# Patient Record
Sex: FEMALE | Race: BLACK OR AFRICAN AMERICAN | Employment: STUDENT | URBAN - METROPOLITAN AREA
[De-identification: names, ages, dates, MRNs, and addresses within clinical notes are randomized per-mention and may not be internally consistent; named-entity substitution may affect disease eponyms.]

---

## 2020-04-24 LAB
ANTIBODY SCREEN, EXTERNAL: NEGATIVE
CHLAMYDIA, EXTERNAL: NEGATIVE
HBSAG, EXTERNAL: NEGATIVE
HIV, EXTERNAL: NEGATIVE
N. GONORRHEA, EXTERNAL: NEGATIVE
RPR, EXTERNAL: NON REACTIVE
RUBELLA, EXTERNAL: NORMAL
TYPE, ABO & RH, EXTERNAL: NORMAL

## 2020-10-21 LAB — GRBS, EXTERNAL: POSITIVE

## 2020-11-05 ENCOUNTER — HOSPITAL ENCOUNTER (EMERGENCY)
Age: 30
Discharge: HOME | End: 2020-11-05
Attending: OBSTETRICS & GYNECOLOGY | Admitting: OBSTETRICS & GYNECOLOGY
Payer: COMMERCIAL

## 2020-11-05 VITALS
BODY MASS INDEX: 31.82 KG/M2 | HEIGHT: 66 IN | TEMPERATURE: 98.3 F | WEIGHT: 198 LBS | RESPIRATION RATE: 17 BRPM | DIASTOLIC BLOOD PRESSURE: 77 MMHG | SYSTOLIC BLOOD PRESSURE: 129 MMHG | HEART RATE: 91 BPM

## 2020-11-05 PROBLEM — Z3A.38 38 WEEKS GESTATION OF PREGNANCY: Status: ACTIVE | Noted: 2020-11-05

## 2020-11-05 PROBLEM — O26.893 ABDOMINAL PAIN IN PREGNANCY, THIRD TRIMESTER: Status: ACTIVE | Noted: 2020-11-05

## 2020-11-05 PROBLEM — R10.9 ABDOMINAL PAIN IN PREGNANCY, THIRD TRIMESTER: Status: ACTIVE | Noted: 2020-11-05

## 2020-11-05 PROBLEM — R10.30 ABDOMINAL PAIN, LOWER: Status: ACTIVE | Noted: 2020-11-05

## 2020-11-05 LAB
APPEARANCE UR: CLEAR
BACTERIA URNS QL MICRO: NEGATIVE /HPF
BILIRUB UR QL: NEGATIVE
COLOR UR: ABNORMAL
EPITH CASTS URNS QL MICRO: ABNORMAL /LPF
GLUCOSE UR STRIP.AUTO-MCNC: NEGATIVE MG/DL
HGB UR QL STRIP: NEGATIVE
HYALINE CASTS URNS QL MICRO: ABNORMAL /LPF (ref 0–5)
KETONES UR QL STRIP.AUTO: NEGATIVE MG/DL
LEUKOCYTE ESTERASE UR QL STRIP.AUTO: ABNORMAL
NITRITE UR QL STRIP.AUTO: NEGATIVE
PH UR STRIP: 6.5 [PH] (ref 5–8)
PROT UR STRIP-MCNC: NEGATIVE MG/DL
RBC #/AREA URNS HPF: ABNORMAL /HPF (ref 0–5)
SP GR UR REFRACTOMETRY: 1.01 (ref 1–1.03)
UA: UC IF INDICATED,UAUC: ABNORMAL
UROBILINOGEN UR QL STRIP.AUTO: 0.2 EU/DL (ref 0.2–1)
WBC URNS QL MICRO: ABNORMAL /HPF (ref 0–4)

## 2020-11-05 PROCEDURE — 81001 URINALYSIS AUTO W/SCOPE: CPT

## 2020-11-05 PROCEDURE — 59025 FETAL NON-STRESS TEST: CPT

## 2020-11-05 PROCEDURE — 2709999900 HC NON-CHARGEABLE SUPPLY

## 2020-11-05 PROCEDURE — 74011250637 HC RX REV CODE- 250/637: Performed by: OBSTETRICS & GYNECOLOGY

## 2020-11-05 PROCEDURE — 99283 EMERGENCY DEPT VISIT LOW MDM: CPT

## 2020-11-05 RX ORDER — VITAMIN A ACETATE, BETA CAROTENE, ASCORBIC ACID, CHOLECALCIFEROL, .ALPHA.-TOCOPHEROL ACETATE, DL-, THIAMINE MONONITRATE, RIBOFLAVIN, NIACINAMIDE, PYRIDOXINE HYDROCHLORIDE, FOLIC ACID, CYANOCOBALAMIN, CALCIUM CARBONATE, FERROUS FUMARATE, ZINC OXIDE, CUPRIC OXIDE 3080; 12; 120; 400; 1; 1.84; 3; 20; 22; 920; 25; 200; 27; 10; 2 [IU]/1; UG/1; MG/1; [IU]/1; MG/1; MG/1; MG/1; MG/1; MG/1; [IU]/1; MG/1; MG/1; MG/1; MG/1; MG/1
1 TABLET, FILM COATED ORAL DAILY
COMMUNITY

## 2020-11-05 RX ORDER — ACETAMINOPHEN 500 MG
1000 TABLET ORAL
Status: DISCONTINUED | OUTPATIENT
Start: 2020-11-05 | End: 2020-11-06 | Stop reason: HOSPADM

## 2020-11-05 RX ORDER — MORPHINE SULFATE 4 MG/ML
10 INJECTION INTRAVENOUS ONCE
Status: DISCONTINUED | OUTPATIENT
Start: 2020-11-05 | End: 2020-11-06 | Stop reason: HOSPADM

## 2020-11-05 RX ADMIN — ACETAMINOPHEN 1000 MG: 500 TABLET ORAL at 19:05

## 2020-11-05 NOTE — PROGRESS NOTES
[address] [address] 11/17/20 [facility] admitted under triage for c/o lower abd cramping. Pt denies any problems with the pregnancy or outside of the pregnancy. [address]  Dr Henriquez Masters at the bedside to talk with the pt about her history. 1849  SVE 0/20/-3  Plan is to wait for [facility] to come back and then re check pt later.

## 2020-11-06 PROBLEM — O47.1 FALSE LABOR AFTER 37 WEEKS OF GESTATION WITHOUT DELIVERY: Status: ACTIVE | Noted: 2020-11-06

## 2020-11-06 NOTE — H&P
Obstetrics Admission History & Physical    Name: Kailee Rodrigues MRN: [id number]  SSN: [id number]    YOB: 1990  Age: 26 y.o. Sex: female      Subjective:     Reason for Admission:  Pregnancy and lower abdominal pain    History of Present Illness: Kailee Rodrigues is a 26 y.o.  female with an estimated gestational age of 36w4d with Estimated Date of Delivery: 20. Patient complains of mild abdominal pain for 4 days. Pregnancy has been uncomplicated. Patient denies headache , nausea and vomiting, vaginal bleeding  and vaginal leaking of fluid . OB History        1    Para        Term                AB        Living           SAB        TAB        Ectopic        Molar        Multiple        Live Births                  No past medical history on file. No past surgical history on file.   Social History     Socioeconomic History    Marital status: Not on file     Spouse name: Not on file    Number of children: Not on file    Years of education: Not on file    Highest education level: Not on file   Occupational History    Not on file   Social Needs    Financial resource strain: Not on file    Food insecurity     Worry: Not on file     Inability: Not on file    Transportation needs     Medical: Not on file     Non-medical: Not on file   Tobacco Use    Smoking status: Never Smoker   Substance and Sexual Activity    Alcohol use: Never     Frequency: Never    Drug use: Never    Sexual activity: Yes     Partners: Male     Birth control/protection: Pill   Lifestyle    Physical activity     Days per week: Not on file     Minutes per session: Not on file    Stress: Not on file   Relationships    Social connections     Talks on phone: Not on file     Gets together: Not on file     Attends Scientologist service: Not on file     Active member of club or organization: Not on file     Attends meetings of clubs or organizations: Not on file     Relationship status: Not on file   Amber Hu Intimate partner violence     Fear of current or ex partner: Not on file     Emotionally abused: Not on file     Physically abused: Not on file     Forced sexual activity: Not on file   Other Topics Concern     Service Not Asked    Blood Transfusions Not Asked    Caffeine Concern Not Asked    Occupational Exposure Not Asked    Hobby Hazards Not Asked    Sleep Concern Not Asked    Stress Concern Not Asked    Weight Concern Not Asked    Special Diet Not Asked    Back Care Not Asked    Exercise Not Asked    Bike Helmet Not Asked    [address] Not Asked    Self-Exams Not Asked   Social History Narrative    Not on file     No family history on file. Allergies   Allergen Reactions    Flagyl [Metronidazole] Other (comments)     Heart-Johnsons syndrome     Prior to Admission medications    Medication Sig Start Date End Date Taking? Authorizing Provider   prenatal vit-calcium-iron-fa (Prenatal Plus, calcium carb,) 27 mg iron- 1 mg tab Take 1 Tab by mouth daily. Yes Provider, Historical        Review of Systems:  A comprehensive review of systems was negative except for that written in the History of Present Illness. Objective:     Vitals:  Blood pressure 129/77, pulse 91, temperature 98.3 °F (36.8 °C), resp. rate 17, height 5' 6\" (1.676 m), weight 89.8 kg (198 lb). Temp (24hrs), Av.8 °F (37.1 °C), Min:98.3 °F (36.8 °C), Max:99.2 °F (37.3 °C)    BP  Min: 129/77  Max: 132/82     Physical Exam:  Patient without distress.   Lung: normal respiratory effort  Abdomen: soft, nontender  Fundus: soft and non tender  Cervical Exam: 0/20 %/-3/   Lower Extremities: minimal edema       Membranes:  Intact    Uterine Activity:  None    Fetal Heart Rate:  Reactive       Labs:   Recent Results (from the past 24 hour(s))   URINALYSIS W/ REFLEX CULTURE    Collection Time: 20  6:30 PM    Specimen: Urine   Result Value Ref Range    Color YELLOW/STRAW      Appearance CLEAR CLEAR      Specific gravity 1.007 1.003 - 1.030      pH (UA) 6.5 5.0 - 8.0      Protein Negative NEG mg/dL    Glucose Negative NEG mg/dL    Ketone Negative NEG mg/dL    Bilirubin Negative NEG      Blood Negative NEG      Urobilinogen 0.2 0.2 - 1.0 EU/dL    Nitrites Negative NEG      Leukocyte Esterase SMALL (A) NEG      WBC PENDING /hpf    RBC PENDING /hpf    Epithelial cells PENDING /lpf    Bacteria PENDING /hpf    UA:UC IF INDICATED PENDING        Assessment and Plan:     Lower abdominal pain, r/o labor. Cervix unchanged since exam in office yesterday. UA micro pending, will treat for UTI if indicated. Recheck in 2 hrs or PRN.     Signed By:  Anita Croft MD     November 5, 2020

## 2020-11-06 NOTE — PROGRESS NOTES
Labor Progress Note      Assessment/Plan:    Patient Active Problem List   Diagnosis Code    Abdominal pain in pregnancy, third trimester O26.893, R10.9    Abdominal pain, lower R10.30    38 weeks gestation of pregnancy Z3A.38    False labor after 37 weeks of gestation without delivery O47.1     Patient desired discharge home, wihtout receiving the morphine  She was discharged home, given labor warnings, and to follow up as  scheduled  Elmira Jay MD  11/6/2020

## 2020-11-06 NOTE — PROGRESS NOTES
Labor Progress Note  Patient seen, fetal heart rate and contraction pattern evaluated, patient examined. Complains of irregular pains. No data found.     Physical Exam:  Cervical Exam  Dilation (cm): 0  Eff: 20 %  Station: -3  Vaginal exam done by? : Dr Dante Leon: Vertex  Membrane Status: Intact       Membranes:  Intact  Uterine Activity: Frequency: Every 5 minutes, Duration: 60 seconds and Intensity: mild  Fetal Heart Rate: Baseline: 125 per minute  Variability: moderate  Accelerations: yes  Decelerations: none  Category: 1      Assessment/Plan:    Patient Active Problem List   Diagnosis Code    Abdominal pain in pregnancy, third trimester O26.893, R10.9    Abdominal pain, lower R10.30    38 weeks gestation of pregnancy Z3A.38     Abdominal pain in pregnancy, third trimester  Painful contractions without cervical change  Will treat with morphine and observe    Zainab Cooper MD  11/5/2020

## 2020-11-06 NOTE — PROGRESS NOTES
1900 - Bedside and Verbal shift change report given to ABRIL Lynch (oncoming nurse) by CONCEPCION Weaver RN (offgoing nurse). Report included the following information SBAR, Kardex, Procedure Summary, MAR and Recent Results. Assumed care of pt at this time. 1905 - Pt c/o pain. RN at pt bedside administering tylenol at this time. 2058 - Dr. Sima Thornton at pt bedside discussing POC with pt. Pt agreeable to receiving IM morphine. RN to administer soon. 2115 - RN at pt bedside to administer IM morphine. Pt states, \"after thinking it through, I have decided to hold off on taking the morphine. I'll just go home. \" RN to notify MD.    2128 - RN notifying MD to pt's decision to go home. Tosin Palafox from MD to d/c pt home. 2140 - RN at pt bedside reviewing d/c instructions with pt, including belly pain in pregnancy, pregnancy precautions, fetal kick counts, and week 38 of pregnancy. Pt verbalizes understanding. 2146 - RN observed pt ambulating off unit with personal belongings and spouse.

## 2020-11-06 NOTE — DISCHARGE INSTRUCTIONS
Pregnancy Precautions: Care Instructions  Your Care Instructions     There is no sure way to prevent labor before your due date ( labor) or to prevent most other pregnancy problems. But there are things you can do to increase your chances of a healthy pregnancy. Go to your appointments, follow your doctor's advice, and take good care of yourself. Eat well, and exercise (if your doctor agrees). And make sure to drink plenty of water. Follow-up care is a key part of your treatment and safety. Be sure to make and go to all appointments, and call your doctor if you are having problems. It's also a good idea to know your test results and keep a list of the medicines you take. How can you care for yourself at home? · Make sure you go to your prenatal appointments. At each visit, your doctor will check your blood pressure. Your doctor will also check to see if you have protein in your urine. High blood pressure and protein in urine are signs of preeclampsia. This condition can be dangerous for you and your baby. · Drink plenty of fluids, enough so that your urine is light yellow or clear like water. Dehydration can cause contractions. If you have kidney, heart, or liver disease and have to limit fluids, talk with your doctor before you increase the amount of fluids you drink. · Tell your doctor right away if you notice any symptoms of an infection, such as:  ? Burning when you urinate. ? A foul-smelling discharge from your vagina. ? Vaginal itching. ? Unexplained fever. ? Unusual pain or soreness in your uterus or lower belly. · Eat a balanced diet. Include plenty of foods that are high in calcium and iron. ? Foods high in calcium include milk, cheese, yogurt, almonds, and broccoli. ? Foods high in iron include red meat, shellfish, poultry, eggs, beans, raisins, whole-grain bread, and leafy green vegetables. · Do not smoke.  If you need help quitting, talk to your doctor about stop-smoking programs and medicines. These can increase your chances of quitting for good. · Do not drink alcohol or use illegal drugs. · Follow your doctor's directions about activity. Your doctor will let you know how much, if any, exercise you can do. · Ask your doctor if you can have sex. If you are at risk for early labor, your doctor may ask you to not have sex. · Take care to prevent falls. During pregnancy, your joints are loose, and your balance is off. Sports such as bicycling, skiing, or in-line skating can increase your risk of falling. And don't ride horses or motorcycles, dive, water ski, scuba dive, or parachute jump while you are pregnant. · Avoid getting very hot. Do not use saunas or hot tubs. Avoid staying out in the sun in hot weather for long periods. Take acetaminophen (Tylenol) to lower a high fever. · Do not take any over-the-counter or herbal medicines or supplements without talking to your doctor or pharmacist first.  When should you call for help? Call 911 anytime you think you may need emergency care. For example, call if:    · You passed out (lost consciousness).     · You have a seizure.     · You have severe vaginal bleeding.     · You have severe pain in your belly or pelvis.     · You have had fluid gushing or leaking from your vagina and you know or think the umbilical cord is bulging into your vagina. If this happens, immediately get down on your knees so your rear end (buttocks) is higher than your head. This will decrease the pressure on the cord until help arrives. Call your doctor now or seek immediate medical care if:    · You have signs of preeclampsia, such as:  ? Sudden swelling of your face, hands, or feet. ? New vision problems (such as dimness, blurring, or seeing spots). ? A severe headache.     · You have any vaginal bleeding.     · You have belly pain or cramping.     · You have a fever.     · You have had regular contractions (with or without pain) for an hour.  This means that you have 8 or more within 1 hour or 4 or more in 20 minutes after you change your position and drink fluids.     · You have a sudden release of fluid from your vagina.     · You have low back pain or pelvic pressure that does not go away.     · You notice that your baby has stopped moving or is moving much less than normal.   Watch closely for changes in your health, and be sure to contact your doctor if you have any problems. Where can you learn more? Go to http://www.hart.com/  Enter Y951 in the search box to learn more about \"Pregnancy Precautions: Care Instructions. \"  Current as of: February 11, 2020               Content Version: 12.6  © 9263-2519 [employer]. Care instructions adapted under license by [employer] (which disclaims liability or warranty for this information). If you have questions about a medical condition or this instruction, always ask your healthcare professional. [place] [address] any warranty or liability for your use of this information. Counting Your Baby's Kicks: Care Instructions  Your Care Instructions     Counting your baby's kicks is one way your doctor can tell that your baby is healthy. Most women--especially in a first pregnancy--feel their baby move for the first time between 16 and 22 weeks. The movement may feel like flutters rather than kicks. Your baby may move more at certain times of the day. When you are active, you may notice less kicking than when you are resting. At your prenatal visits, your doctor will ask whether the baby is active. In your last trimester, your doctor may ask you to count the number of times you feel your baby move. Follow-up care is a key part of your treatment and safety. Be sure to make and go to all appointments, and call your doctor if you are having problems. It's also a good idea to know your test results and keep a list of the medicines you take.   How do you count fetal kicks? · A common method of checking your baby's movement is to count the number of kicks or moves you feel in 1 hour. Ten movements (such as kicks, flutters, or rolls) in 1 hour are normal. Some doctors suggest that you count in the morning until you get to 10 movements. Then you can quit for that day and start again the next day. · Pick your baby's most active time of day to count. This may be any time from morning to evening. · If you do not feel 10 movements in an hour, your baby may be sleeping. Wait for the next hour and count again. When should you call for help? Call your doctor now or seek immediate medical care if:    · You noticed that your baby has stopped moving or is moving much less than normal.   Watch closely for changes in your health, and be sure to contact your doctor if you have any problems. Where can you learn more? Go to http://www.gray.com/  Enter [id number] in the search box to learn more about \"Counting Your Baby's Kicks: Care Instructions. \"  Current as of: February 11, 2020               Content Version: 12.6  © 1095-7038 [employer]. Care instructions adapted under license by [employer] (which disclaims liability or warranty for this information). If you have questions about a medical condition or this instruction, always ask your healthcare professional. [address] any warranty or liability for your use of this information. Belly Pain in Pregnancy: Care Instructions  Your Care Instructions     When you're pregnant, any belly pain can be a worry. You may not want to call your doctor about every pain you have. But you don't want to miss something that is dangerous for you or your baby. Even if it feels familiar, belly pain can mean something new when you're pregnant. It's important to know when to call your doctor.  It will also help to know how to care for yourself at home when your pain is not caused by anything harmful. · When belly pain is more severe or constant, see a doctor right away. · If you're sure your belly pain is a sign of labor, call your doctor. · When belly pain is brief, it's usually a normal part of pregnancy. It might be related to changes in the growing uterus. Or it could be the stretching of ligaments called round ligaments. These ligaments help support the uterus. Round ligament pain can be on either side of your belly. It can also be felt in your hips or groin. Follow-up care is a key part of your treatment and safety. Be sure to make and go to all appointments, and call your doctor if you are having problems. It's also a good idea to know your test results and keep a list of the medicines you take. How can you tell if belly pain is a sign of labor? When belly pain is caused by labor, it can feel like mild or menstrual-like cramps in your lower belly. These cramps are probably contractions. They can happen in your second or third trimester. You may also have:  · A steady, dull ache in your lower back, pelvis, or thighs. · A feeling of pressure in your pelvis or lower belly. · Changes in your vaginal discharge or a sudden release of fluid from the vagina. If you think you are in labor, call your doctor. How can you care for yourself at home? When belly pain is mild and is not a symptom of labor:  · Rest until you feel better. · Take a warm bath. · Think about what you drink and eat:  ? Drink plenty of fluids. Choose water and other caffeine-free clear liquids until you feel better. ? Try eating small, frequent meals. If your stomach is upset, try bland, low-fat foods like plain rice, broiled chicken, toast, and yogurt. · Think about how you move if you are having brief pains from stretching of the round ligaments. ? Try gentle stretching. ? Move a little more slowly when turning in bed or getting up from a chair, so those ligaments don't stretch quickly. ?  [employer] forward a bit if you think you are going to cough or sneeze. When should you call for help? Call 911 anytime you think you may need emergency care. For example, call if:    · You have sudden, severe pain in your belly.     · You have severe vaginal bleeding.     · You passed out (lost consciousness).     · You have a seizure. Call your doctor now or seek immediate medical care if:    · You have new or worse belly pain or cramping.     · You have any vaginal bleeding.     · You have a fever.     · You have symptoms of preeclampsia, such as:  ? Sudden swelling of your face, hands, or feet. ? New vision problems (such as dimness, blurring, or seeing spots). ? A severe headache.     · You think that you may be in labor. This means that you've had at least 8 contractions within 1 hour or at least 4 contractions within 20 minutes, even after you change your position and drink fluids.     · You have symptoms of a urinary tract infection. These may include:  ? Pain or burning when you urinate. ? A frequent need to urinate without being able to pass much urine. ? Pain in the flank, which is just below the rib cage and above the waist on either side of the back. ? Blood in your urine. Watch closely for changes in your health, and be sure to contact your doctor if you are worried about your or your baby's health. Where can you learn more? Go to http://www.hart.com/  Enter B275 in the search box to learn more about \"Belly Pain in Pregnancy: Care Instructions. \"  Current as of: February 11, 2020               Content Version: 12.6  © 1284-0585 [employer]. Care instructions adapted under license by [employer] (which disclaims liability or warranty for this information).  If you have questions about a medical condition or this instruction, always ask your healthcare professional. [place] [address] any warranty or liability for your use of this information. Week 38 of Your Pregnancy: Care Instructions  Your Care Instructions     Believe it or not, your baby is almost here. You may have ideas about your baby's personality because of how much he or she moves. Or you may have noticed how he or she responds to sounds, warmth, cold, and light. You may even know what kind of music your baby likes. By now, you have a better idea of what to expect during delivery. You may have talked about your birth preferences with your doctor. But even if you want a vaginal birth, it is a good idea to learn about  births.  birth means that your baby is born through a cut (incision) in your lower belly. It is sometimes the best choice for the health of the baby and the mother. This care sheet can help you understand  births. It also gives you information about what to expect after your baby is born. And it helps you understand more about postpartum depression. Follow-up care is a key part of your treatment and safety. Be sure to make and go to all appointments, and call your doctor if you are having problems. It's also a good idea to know your test results and keep a list of the medicines you take. How can you care for yourself at home? Learn about  birth  · Most C-sections are unplanned. They are done because of problems that occur during labor. These problems might include:  ? Labor that slows or stops. ? High blood pressure or other problems for the mother. ? Signs of distress in the baby. These signs may include a very fast or slow heart rate. · Although most mothers and babies do well after , it is major surgery. It has more risks than a vaginal delivery. · In some cases, a planned  may be safer than a vaginal delivery. This may be the case if:  ? The mother has a health problem, such as a heart condition. ? The baby isn't in a head-down position for delivery. This is called a breech position. ?  The uterus has scars from past surgeries. This could increase the chance of a tear in the uterus. ? There is a problem with the placenta. ? The mother has an infection, such as genital herpes, that could be spread to the baby. ? The mother is having twins or more. ? The baby weighs 9 to 10 pounds or more. · Because of the risks of , planned C-sections generally should be done only for medical reasons. And a planned  should be done at 39 weeks or later unless there is a medical reason to do it sooner. Know what to expect after delivery, and plan for the first few weeks at home  · You, your baby, and your partner or  will get identification bands. Only people with matching bands can  the baby from the nursery. · You will learn how to feed, diaper, and bathe your baby. And you will learn how to care for the umbilical cord stump. If your baby will be circumcised, you will also learn how to care for that. · Ask people to wait to visit you until you are at home. And ask them to wash their hands before they touch your baby. · Make sure you have another adult in your home for at least 2 or 3 days after the birth. · During the first 2 weeks, limit when friends and family can visit. · Do not allow visitors who have colds or infections. Make sure all visitors are up to date with their vaccinations. Never let anyone smoke around your baby. · Try to nap when the baby naps. Be aware of postpartum depression  · \"Baby blues\" are common for the first 1 to 2 weeks after birth. You may cry or feel sad or irritable for no reason. · For some women, these feelings last longer and are more intense. This is called postpartum depression. · If your symptoms last for more than a few weeks or you feel very depressed, ask your doctor for help. · Postpartum depression can be treated. Support groups and counseling can help. Sometimes medicine can also help. Where can you learn more?   Go to http://www.gray.com/  Enter B044 in the search box to learn more about \"Week 38 of Your Pregnancy: Care Instructions. \"  Current as of: February 11, 2020               Content Version: 12.6  © 9876-6503 [employer]. Care instructions adapted under license by [employer] (which disclaims liability or warranty for this information). If you have questions about a medical condition or this instruction, always ask your healthcare professional. [place] [address] any warranty or liability for your use of this information.

## 2020-11-11 ENCOUNTER — HOSPITAL ENCOUNTER (INPATIENT)
Age: 30
LOS: 3 days | Discharge: HOME | End: 2020-11-14
Attending: OBSTETRICS & GYNECOLOGY | Admitting: OBSTETRICS & GYNECOLOGY
Payer: COMMERCIAL

## 2020-11-11 ENCOUNTER — TRANSCRIBE ORDER (OUTPATIENT)
Dept: REGISTRATION | Age: 30
End: 2020-11-11

## 2020-11-11 ENCOUNTER — HOSPITAL ENCOUNTER (OUTPATIENT)
Dept: LAB | Age: 30
Discharge: HOME | End: 2020-11-11
Payer: COMMERCIAL

## 2020-11-11 DIAGNOSIS — Z01.812 PRE-PROCEDURAL LABORATORY EXAMINATIONS: ICD-10-CM

## 2020-11-11 DIAGNOSIS — G89.18 POSTOPERATIVE PAIN: Primary | ICD-10-CM

## 2020-11-11 DIAGNOSIS — Z01.812 PRE-PROCEDURAL LABORATORY EXAMINATIONS: Primary | ICD-10-CM

## 2020-11-11 PROBLEM — O13.9 GESTATIONAL HYPERTENSION AFFECTING FIRST PREGNANCY: Status: ACTIVE | Noted: 2020-11-11

## 2020-11-11 LAB
ALBUMIN SERPL-MCNC: 2.8 G/DL (ref 3.5–5)
ALBUMIN/GLOB SERPL: 0.8 {RATIO} (ref 1.1–2.2)
ALP SERPL-CCNC: 314 U/L (ref 45–117)
ALT SERPL-CCNC: 22 U/L (ref 12–78)
ANION GAP SERPL CALC-SCNC: 6 MMOL/L (ref 5–15)
AST SERPL-CCNC: 20 U/L (ref 15–37)
BASOPHILS # BLD: 0 K/UL (ref 0–0.1)
BASOPHILS NFR BLD: 0 % (ref 0–1)
BILIRUB SERPL-MCNC: 0.4 MG/DL (ref 0.2–1)
BUN SERPL-MCNC: 6 MG/DL (ref 6–20)
BUN/CREAT SERPL: 10 (ref 12–20)
CALCIUM SERPL-MCNC: 8.8 MG/DL (ref 8.5–10.1)
CHLORIDE SERPL-SCNC: 110 MMOL/L (ref 97–108)
CO2 SERPL-SCNC: 23 MMOL/L (ref 21–32)
COVID-19 RAPID TEST, COVR: NOT DETECTED
CREAT SERPL-MCNC: 0.58 MG/DL (ref 0.55–1.02)
CREAT UR-MCNC: 48 MG/DL
DIFFERENTIAL METHOD BLD: ABNORMAL
EOSINOPHIL # BLD: 0.1 K/UL (ref 0–0.4)
EOSINOPHIL NFR BLD: 1 % (ref 0–7)
ERYTHROCYTE [DISTWIDTH] IN BLOOD BY AUTOMATED COUNT: 15.2 % (ref 11.5–14.5)
GLOBULIN SER CALC-MCNC: 3.3 G/DL (ref 2–4)
GLUCOSE SERPL-MCNC: 83 MG/DL (ref 65–100)
HCT VFR BLD AUTO: 36.3 % (ref 35–47)
HEALTH STATUS, XMCV2T: NORMAL
HGB BLD-MCNC: 11.6 G/DL (ref 11.5–16)
IMM GRANULOCYTES # BLD AUTO: 0.2 K/UL (ref 0–0.04)
IMM GRANULOCYTES NFR BLD AUTO: 1 % (ref 0–0.5)
LYMPHOCYTES # BLD: 2.7 K/UL (ref 0.8–3.5)
LYMPHOCYTES NFR BLD: 19 % (ref 12–49)
MCH RBC QN AUTO: 26.2 PG (ref 26–34)
MCHC RBC AUTO-ENTMCNC: 32 G/DL (ref 30–36.5)
MCV RBC AUTO: 82.1 FL (ref 80–99)
MONOCYTES # BLD: 0.8 K/UL (ref 0–1)
MONOCYTES NFR BLD: 5 % (ref 5–13)
NEUTS SEG # BLD: 10.4 K/UL (ref 1.8–8)
NEUTS SEG NFR BLD: 74 % (ref 32–75)
NRBC # BLD: 0 K/UL (ref 0–0.01)
NRBC BLD-RTO: 0 PER 100 WBC
PLATELET # BLD AUTO: 343 K/UL (ref 150–400)
PMV BLD AUTO: 9.5 FL (ref 8.9–12.9)
POTASSIUM SERPL-SCNC: 4.2 MMOL/L (ref 3.5–5.1)
PROT SERPL-MCNC: 6.1 G/DL (ref 6.4–8.2)
PROT UR-MCNC: <5 MG/DL (ref 0–11.9)
PROT/CREAT UR-RTO: NORMAL
RBC # BLD AUTO: 4.42 M/UL (ref 3.8–5.2)
SODIUM SERPL-SCNC: 139 MMOL/L (ref 136–145)
SOURCE, COVRS: NORMAL
SPECIMEN SOURCE, FCOV2M: NORMAL
SPECIMEN TYPE, XMCV1T: NORMAL
WBC # BLD AUTO: 14.1 K/UL (ref 3.6–11)

## 2020-11-11 PROCEDURE — 77030014125 HC TY EPDRL BBMI -B: Performed by: ANESTHESIOLOGY

## 2020-11-11 PROCEDURE — 77030005513 HC CATH URETH FOL11 MDII -B

## 2020-11-11 PROCEDURE — 75410000002 HC LABOR FEE PER 1 HR: Performed by: OBSTETRICS & GYNECOLOGY

## 2020-11-11 PROCEDURE — 87635 SARS-COV-2 COVID-19 AMP PRB: CPT

## 2020-11-11 PROCEDURE — 85025 COMPLETE CBC W/AUTO DIFF WBC: CPT

## 2020-11-11 PROCEDURE — 76060000078 HC EPIDURAL ANESTHESIA: Performed by: ANESTHESIOLOGY

## 2020-11-11 PROCEDURE — 84156 ASSAY OF PROTEIN URINE: CPT

## 2020-11-11 PROCEDURE — 74011250636 HC RX REV CODE- 250/636: Performed by: OBSTETRICS & GYNECOLOGY

## 2020-11-11 PROCEDURE — 75410000003 HC RECOV DEL/VAG/CSECN EA 0.5 HR: Performed by: OBSTETRICS & GYNECOLOGY

## 2020-11-11 PROCEDURE — 2709999900 HC NON-CHARGEABLE SUPPLY

## 2020-11-11 PROCEDURE — 80053 COMPREHEN METABOLIC PANEL: CPT

## 2020-11-11 PROCEDURE — 10907ZC DRAINAGE OF AMNIOTIC FLUID, THERAPEUTIC FROM PRODUCTS OF CONCEPTION, VIA NATURAL OR ARTIFICIAL OPENING: ICD-10-PCS | Performed by: OBSTETRICS & GYNECOLOGY

## 2020-11-11 PROCEDURE — 36415 COLL VENOUS BLD VENIPUNCTURE: CPT

## 2020-11-11 PROCEDURE — 74011000258 HC RX REV CODE- 258: Performed by: OBSTETRICS & GYNECOLOGY

## 2020-11-11 PROCEDURE — 0UQGXZZ REPAIR VAGINA, EXTERNAL APPROACH: ICD-10-PCS | Performed by: OBSTETRICS & GYNECOLOGY

## 2020-11-11 PROCEDURE — 75410000000 HC DELIVERY VAGINAL/SINGLE: Performed by: OBSTETRICS & GYNECOLOGY

## 2020-11-11 PROCEDURE — 99285 EMERGENCY DEPT VISIT HI MDM: CPT

## 2020-11-11 PROCEDURE — 59025 FETAL NON-STRESS TEST: CPT

## 2020-11-11 PROCEDURE — 00HU33Z INSERTION OF INFUSION DEVICE INTO SPINAL CANAL, PERCUTANEOUS APPROACH: ICD-10-PCS | Performed by: ANESTHESIOLOGY

## 2020-11-11 PROCEDURE — 65270000029 HC RM PRIVATE

## 2020-11-11 RX ORDER — ONDANSETRON 2 MG/ML
4 INJECTION INTRAMUSCULAR; INTRAVENOUS
Status: DISCONTINUED | OUTPATIENT
Start: 2020-11-11 | End: 2020-11-12

## 2020-11-11 RX ORDER — MAG HYDROX/ALUMINUM HYD/SIMETH 200-200-20
30 SUSPENSION, ORAL (FINAL DOSE FORM) ORAL
Status: DISCONTINUED | OUTPATIENT
Start: 2020-11-11 | End: 2020-11-12

## 2020-11-11 RX ORDER — OXYTOCIN/RINGER'S LACTATE 30/500 ML
95 PLASTIC BAG, INJECTION (ML) INTRAVENOUS AS NEEDED
Status: COMPLETED | OUTPATIENT
Start: 2020-11-11 | End: 2020-11-12

## 2020-11-11 RX ORDER — OXYTOCIN/RINGER'S LACTATE 30/500 ML
10 PLASTIC BAG, INJECTION (ML) INTRAVENOUS AS NEEDED
Status: COMPLETED | OUTPATIENT
Start: 2020-11-11 | End: 2020-11-12

## 2020-11-11 RX ORDER — OXYTOCIN/RINGER'S LACTATE 30/500 ML
0-20 PLASTIC BAG, INJECTION (ML) INTRAVENOUS
Status: DISCONTINUED | OUTPATIENT
Start: 2020-11-11 | End: 2020-11-12

## 2020-11-11 RX ORDER — BUTORPHANOL TARTRATE 2 MG/ML
2 INJECTION INTRAMUSCULAR; INTRAVENOUS
Status: DISCONTINUED | OUTPATIENT
Start: 2020-11-11 | End: 2020-11-12

## 2020-11-11 RX ORDER — FENTANYL/BUPIVACAINE/NS/PF 2-1250MCG
1-16 PREFILLED PUMP RESERVOIR EPIDURAL CONTINUOUS
Status: DISCONTINUED | OUTPATIENT
Start: 2020-11-12 | End: 2020-11-12

## 2020-11-11 RX ORDER — ACETAMINOPHEN 325 MG/1
650 TABLET ORAL
Status: DISCONTINUED | OUTPATIENT
Start: 2020-11-11 | End: 2020-11-12 | Stop reason: SDUPTHER

## 2020-11-11 RX ORDER — SODIUM CHLORIDE, SODIUM LACTATE, POTASSIUM CHLORIDE, CALCIUM CHLORIDE 600; 310; 30; 20 MG/100ML; MG/100ML; MG/100ML; MG/100ML
125 INJECTION, SOLUTION INTRAVENOUS CONTINUOUS
Status: DISCONTINUED | OUTPATIENT
Start: 2020-11-11 | End: 2020-11-12

## 2020-11-11 RX ORDER — SODIUM CHLORIDE 0.9 % (FLUSH) 0.9 %
5-40 SYRINGE (ML) INJECTION AS NEEDED
Status: DISCONTINUED | OUTPATIENT
Start: 2020-11-11 | End: 2020-11-12

## 2020-11-11 RX ORDER — EPHEDRINE SULFATE/0.9% NACL/PF 50 MG/5 ML
20 SYRINGE (ML) INTRAVENOUS
Status: DISCONTINUED | OUTPATIENT
Start: 2020-11-11 | End: 2020-11-12

## 2020-11-11 RX ADMIN — SODIUM CHLORIDE 2.5 MILLION UNITS: 900 INJECTION, SOLUTION INTRAVENOUS at 22:37

## 2020-11-11 RX ADMIN — PROMETHAZINE HYDROCHLORIDE 12.5 MG: 25 INJECTION INTRAMUSCULAR; INTRAVENOUS at 21:50

## 2020-11-11 RX ADMIN — SODIUM CHLORIDE, SODIUM LACTATE, POTASSIUM CHLORIDE, AND CALCIUM CHLORIDE 125 ML/HR: 600; 310; 30; 20 INJECTION, SOLUTION INTRAVENOUS at 13:35

## 2020-11-11 RX ADMIN — SODIUM CHLORIDE 2.5 MILLION UNITS: 900 INJECTION, SOLUTION INTRAVENOUS at 17:35

## 2020-11-11 RX ADMIN — SODIUM CHLORIDE 5 MILLION UNITS: 900 INJECTION INTRAVENOUS at 13:52

## 2020-11-11 RX ADMIN — OXYTOCIN 2 MILLI-UNITS/MIN: 10 INJECTION, SOLUTION INTRAMUSCULAR; INTRAVENOUS at 13:54

## 2020-11-11 RX ADMIN — SODIUM CHLORIDE, SODIUM LACTATE, POTASSIUM CHLORIDE, AND CALCIUM CHLORIDE 125 ML/HR: 600; 310; 30; 20 INJECTION, SOLUTION INTRAVENOUS at 21:32

## 2020-11-11 RX ADMIN — BUTORPHANOL TARTRATE 2 MG: 2 INJECTION, SOLUTION INTRAMUSCULAR; INTRAVENOUS at 21:31

## 2020-11-11 NOTE — H&P
History and Physical    Patient: Zhang Brooks MRN: [id number]  SSN: [id number]    YOB: 1990  Age: 26 y.o. Sex: female      Subjective:      Zhang Brooks is a 26 y.o. female G1 at 39+1 sent from office for evaluation. Pt presented this morning to the office with complaints of contractions. She has made cervical change from 1 week ago to 3cm dilation and was noted to be breathing through contractions and uncomfortable. Also, during a contraction her BP was taken and noted to be elevated at 144/87. Repeat was normal outside of a contraction. No LOF, VB, VD, dysuria. Has good fetal movement. Pregnancy problems:   - Uterine fibroid 3cm IM anterior, stable in size at 28w  - GBS carrier- GBS ppx during labor    No past medical history on file. No past surgical history on file. No family history on file. Social History     Tobacco Use    Smoking status: Never Smoker   Substance Use Topics    Alcohol use: Never     Frequency: Never      Prior to Admission medications    Medication Sig Start Date End Date Taking? Authorizing Provider   prenatal vit-calcium-iron-fa (Prenatal Plus, calcium carb,) 27 mg iron- 1 mg tab Take 1 Tab by mouth daily. Provider, Historical        Allergies   Allergen Reactions    Flagyl [Metronidazole] Other (comments)     Heart-Johnsons syndrome       Review of Systems:  A comprehensive review of systems was negative except for that written in the History of Present Illness. Objective: There were no vitals filed for this visit. Physical Exam:  Gen: alert and oriented X3   Resp:nonlabored respirations  Cardiac: normal peripheral perfusion  Abdomen: soft, nontender, nondistended.  Gravid  Ext: no pitting edema  SVE: 3/50/-3  Clinical EFW 7.5lbs    Bedside ultrasound: cephalic presentation    CEFM: 130bpm, moderate variability, +Accels, no decels  TOCO: irregular ever 6-12 minutes, not felt by patient    Labs: Rh+, GBS+, NIPT wnl, RI    Assessment/Plan: 30yo G1 at 39+1 sent from the office for ROL and ROPIH.     ROL: cervix in office 3/60/-3  -CE unchanged    ROPIH: Had one elevated bp during contraction in the office, followed by normal BP.   -Serial bp monitoring    Pregnancy problems:   - fibroid 3cm IM anterior, growth 28wks 91%ile    IUP: Cat 1 fetal tracing, NIPT wnl, 28wk growth 91%ile  - XY \"Florin III\"    PNL:Rh+, GBS+- PCN for ppx, RI, COVID pending    Dispo: pending cervix, toco and cervix re-evaluation        Signed By: Pasquale Mcguire MD     November 11, 2020

## 2020-11-11 NOTE — H&P
S: Pt comfortable, no complaints    O:   Visit Vitals  BP (!) 136/92   Pulse 95   Temp 98.4 °F (36.9 °C)   Resp 18   Ht 5' 6\" (1.676 m)   Wt 89.8 kg (198 lb)   SpO2 97%   BMI 31.96 kg/m²     Temp:  [98.4 °F (36.9 °C)]   Pulse (Heart Rate):  [88-95]   BP: (133-136)/(88-92)   Resp Rate:  [18]   O2 Sat (%):  [97 %-100 %]   Weight:  [89.8 kg (198 lb)]     Gen: alert and oriented X3   Resp:nonlabored respirations  Cardiac: normal peripheral perfusion  Abdomen: soft, nontender, nondistended. Gravid  Ext: no pitting edema    A/P: 30yo G1 at 39+1 sent from office for ROL now with new diagnosis of GHTN. Will admit for AOL for GHTN. GHTN: two mild range Bps >4h apart. Asymptomatic  - pih labs sent  - serial BP monitoring    IOL: Cervix unchanged from this morning 3/50/-3  - start pitocin, AROM PRN after PCN doses  - SLIV, CBC, hold sample    Pregnancy problems: 3cm anterior fibroid stable in size IM    PNL: Rh+/RI  - PCN for GBS +  - COVID pending    Dispo: Expectant . C/s for standard maternal and fetal indications.     Cristofer Wilkerson MD

## 2020-11-11 NOTE — PROGRESS NOTES
1151 Patient is a G1 at 39 weeks 1 day here for complaints of contractions today that are irregular but increasing in pain and discomfort. Denies any vaginal bleeding or leaking of fluid. Got her COVID swab done this morning- if patient stays, will need rapid test done. Patient was seen last week and was closed, but was 3 cm in the office today. While at the office, patient did have a mildly elevated blood pressure. Will do serial blood pressure monitoring. 1204 Patient placed on EFM at this time. 1208 Admission assessment completed. 65 Dr. Jessica Contreras at bedside discussing plan with patient. Will monitor contractions and blood pressure for now and MD will come recheck patient's cervix shortly. 1901 [facility] 87 readjusted. 1303 Reviewing blood pressures with Dr. Jessica Contreras. Md states to admit patient based on her blood pressures. Will evaluate cervix to see if patient needs augmentation. 1310 Reviewing plan of care with patient. Patient agreeable. Reviewed and signed consents with patient. 1325 IV started. Labs collected. 300 [facility] Dr. Jessica Contreras performing [place] 96. 3/50/-3. Unchanged from office this morning. Will augment with pitocin. Also start GBS prophylaxis. Patient states understanding. 1330 Dr. Thomas Saunders. Confirmed vertex presentation. 1354 Pitocin started per MD order. 1720 [facility] swab collected. 1432 Rounding on patient. Doing well. Denies any needs. 3700 [facility] adjusted. 1515 Increasing pitocin. Patient just ambulatory to the bathroom. States she is doing well overall. Denies any needs. 1547 Increasing pitocin. Denies any needs. 1623 Overall doing well. Denies any needs. Stating her contractions are painful but not yet wanting any pain medication. [address] [facility] [address] adjusted. 1720 Patient called to the nurses station stating that her water broke. RN at bedside confirming CARY Henry. 1811 BP taken. Patient tolerating contractions well.  Denies any needs. [address] [facility]. Patient breathing through contractions. Sitting on side of bed.     1900 Bedside and Verbal shift change report given to JB Jara (oncoming nurse) by Lainey Schulte RN (offgoing nurse). Report included the following information SBAR, Kardex, MAR, Accordion, Recent Results and Med Rec Status.

## 2020-11-12 ENCOUNTER — ANESTHESIA (OUTPATIENT)
Dept: LABOR AND DELIVERY | Age: 30
End: 2020-11-12
Payer: COMMERCIAL

## 2020-11-12 ENCOUNTER — ANESTHESIA EVENT (OUTPATIENT)
Dept: LABOR AND DELIVERY | Age: 30
End: 2020-11-12
Payer: COMMERCIAL

## 2020-11-12 LAB — SARS-COV-2, COV2NT: NOT DETECTED

## 2020-11-12 PROCEDURE — 74011250636 HC RX REV CODE- 250/636: Performed by: OBSTETRICS & GYNECOLOGY

## 2020-11-12 PROCEDURE — 74011000250 HC RX REV CODE- 250: Performed by: ANESTHESIOLOGY

## 2020-11-12 PROCEDURE — 74011000258 HC RX REV CODE- 258: Performed by: OBSTETRICS & GYNECOLOGY

## 2020-11-12 PROCEDURE — 74011250636 HC RX REV CODE- 250/636: Performed by: ANESTHESIOLOGY

## 2020-11-12 PROCEDURE — 75410000002 HC LABOR FEE PER 1 HR: Performed by: OBSTETRICS & GYNECOLOGY

## 2020-11-12 PROCEDURE — 77030019905 HC CATH URETH INTMIT MDII -A

## 2020-11-12 PROCEDURE — 77030021125

## 2020-11-12 PROCEDURE — 74011250637 HC RX REV CODE- 250/637: Performed by: OBSTETRICS & GYNECOLOGY

## 2020-11-12 PROCEDURE — 0UQMXZZ REPAIR VULVA, EXTERNAL APPROACH: ICD-10-PCS | Performed by: OBSTETRICS & GYNECOLOGY

## 2020-11-12 PROCEDURE — 65270000029 HC RM PRIVATE

## 2020-11-12 RX ORDER — LIDOCAINE HYDROCHLORIDE AND EPINEPHRINE 15; 5 MG/ML; UG/ML
INJECTION, SOLUTION EPIDURAL AS NEEDED
Status: DISCONTINUED | OUTPATIENT
Start: 2020-11-12 | End: 2020-11-12 | Stop reason: HOSPADM

## 2020-11-12 RX ORDER — OXYCODONE AND ACETAMINOPHEN 5; 325 MG/1; MG/1
1 TABLET ORAL
Status: DISCONTINUED | OUTPATIENT
Start: 2020-11-12 | End: 2020-11-14 | Stop reason: HOSPADM

## 2020-11-12 RX ORDER — ACETAMINOPHEN 325 MG/1
650 TABLET ORAL
Status: DISCONTINUED | OUTPATIENT
Start: 2020-11-12 | End: 2020-11-12

## 2020-11-12 RX ORDER — ACETAMINOPHEN 500 MG
1000 TABLET ORAL
Status: DISCONTINUED | OUTPATIENT
Start: 2020-11-12 | End: 2020-11-12

## 2020-11-12 RX ORDER — ONDANSETRON 4 MG/1
4 TABLET, ORALLY DISINTEGRATING ORAL
Status: DISCONTINUED | OUTPATIENT
Start: 2020-11-12 | End: 2020-11-14 | Stop reason: HOSPADM

## 2020-11-12 RX ORDER — HYDROCORTISONE ACETATE PRAMOXINE HCL 2.5; 1 G/100G; G/100G
CREAM TOPICAL AS NEEDED
Status: DISCONTINUED | OUTPATIENT
Start: 2020-11-12 | End: 2020-11-14 | Stop reason: HOSPADM

## 2020-11-12 RX ORDER — OXYTOCIN/RINGER'S LACTATE 30/500 ML
10 PLASTIC BAG, INJECTION (ML) INTRAVENOUS AS NEEDED
Status: DISCONTINUED | OUTPATIENT
Start: 2020-11-12 | End: 2020-11-14 | Stop reason: HOSPADM

## 2020-11-12 RX ORDER — FACIAL-BODY WIPES
10 EACH TOPICAL DAILY PRN
Status: DISCONTINUED | OUTPATIENT
Start: 2020-11-12 | End: 2020-11-14 | Stop reason: HOSPADM

## 2020-11-12 RX ORDER — BUPIVACAINE HYDROCHLORIDE 2.5 MG/ML
INJECTION, SOLUTION EPIDURAL; INFILTRATION; INTRACAUDAL AS NEEDED
Status: DISCONTINUED | OUTPATIENT
Start: 2020-11-12 | End: 2020-11-12 | Stop reason: HOSPADM

## 2020-11-12 RX ORDER — SWAB
1 SWAB, NON-MEDICATED MISCELLANEOUS DAILY
Status: DISCONTINUED | OUTPATIENT
Start: 2020-11-13 | End: 2020-11-14 | Stop reason: HOSPADM

## 2020-11-12 RX ORDER — DIPHENHYDRAMINE HCL 25 MG
25 CAPSULE ORAL
Status: DISCONTINUED | OUTPATIENT
Start: 2020-11-12 | End: 2020-11-14 | Stop reason: HOSPADM

## 2020-11-12 RX ORDER — OXYTOCIN/RINGER'S LACTATE 30/500 ML
95 PLASTIC BAG, INJECTION (ML) INTRAVENOUS AS NEEDED
Status: DISCONTINUED | OUTPATIENT
Start: 2020-11-12 | End: 2020-11-14 | Stop reason: HOSPADM

## 2020-11-12 RX ORDER — SIMETHICONE 80 MG
80 TABLET,CHEWABLE ORAL
Status: DISCONTINUED | OUTPATIENT
Start: 2020-11-12 | End: 2020-11-14 | Stop reason: HOSPADM

## 2020-11-12 RX ORDER — IBUPROFEN 800 MG/1
800 TABLET ORAL EVERY 8 HOURS
Status: DISCONTINUED | OUTPATIENT
Start: 2020-11-12 | End: 2020-11-14 | Stop reason: HOSPADM

## 2020-11-12 RX ORDER — ACETAMINOPHEN 325 MG/1
650 TABLET ORAL
Status: DISCONTINUED | OUTPATIENT
Start: 2020-11-12 | End: 2020-11-14 | Stop reason: HOSPADM

## 2020-11-12 RX ORDER — NALOXONE HYDROCHLORIDE 0.4 MG/ML
0.4 INJECTION, SOLUTION INTRAMUSCULAR; INTRAVENOUS; SUBCUTANEOUS AS NEEDED
Status: DISCONTINUED | OUTPATIENT
Start: 2020-11-12 | End: 2020-11-14 | Stop reason: HOSPADM

## 2020-11-12 RX ADMIN — SODIUM CHLORIDE 2.5 MILLION UNITS: 900 INJECTION, SOLUTION INTRAVENOUS at 03:13

## 2020-11-12 RX ADMIN — Medication 10 ML/HR: at 09:08

## 2020-11-12 RX ADMIN — IBUPROFEN 800 MG: 800 TABLET ORAL at 18:05

## 2020-11-12 RX ADMIN — OXYCODONE HYDROCHLORIDE AND ACETAMINOPHEN 1 TABLET: 5; 325 TABLET ORAL at 21:00

## 2020-11-12 RX ADMIN — BUPIVACAINE HYDROCHLORIDE 8 ML: 2.5 INJECTION, SOLUTION EPIDURAL; INFILTRATION; INTRACAUDAL; PERINEURAL at 00:04

## 2020-11-12 RX ADMIN — OXYTOCIN: 10 INJECTION, SOLUTION INTRAMUSCULAR; INTRAVENOUS at 13:20

## 2020-11-12 RX ADMIN — SODIUM CHLORIDE, SODIUM LACTATE, POTASSIUM CHLORIDE, AND CALCIUM CHLORIDE 125 ML/HR: 600; 310; 30; 20 INJECTION, SOLUTION INTRAVENOUS at 01:00

## 2020-11-12 RX ADMIN — SODIUM CHLORIDE 2.5 MILLION UNITS: 900 INJECTION, SOLUTION INTRAVENOUS at 11:30

## 2020-11-12 RX ADMIN — OXYTOCIN 2 MILLI-UNITS/MIN: 10 INJECTION, SOLUTION INTRAMUSCULAR; INTRAVENOUS at 04:00

## 2020-11-12 RX ADMIN — SODIUM CHLORIDE, SODIUM LACTATE, POTASSIUM CHLORIDE, AND CALCIUM CHLORIDE 500 ML: 600; 310; 30; 20 INJECTION, SOLUTION INTRAVENOUS at 00:30

## 2020-11-12 RX ADMIN — SODIUM CHLORIDE, SODIUM LACTATE, POTASSIUM CHLORIDE, AND CALCIUM CHLORIDE 125 ML/HR: 600; 310; 30; 20 INJECTION, SOLUTION INTRAVENOUS at 06:19

## 2020-11-12 RX ADMIN — LIDOCAINE HYDROCHLORIDE AND EPINEPHRINE 3 ML: 15; 5 INJECTION, SOLUTION EPIDURAL at 00:05

## 2020-11-12 RX ADMIN — SODIUM CHLORIDE 2.5 MILLION UNITS: 900 INJECTION, SOLUTION INTRAVENOUS at 07:36

## 2020-11-12 RX ADMIN — OXYTOCIN 95 MILLI-UNITS/MIN: 10 INJECTION, SOLUTION INTRAMUSCULAR; INTRAVENOUS at 13:40

## 2020-11-12 RX ADMIN — ACETAMINOPHEN 1000 MG: 500 TABLET ORAL at 12:02

## 2020-11-12 RX ADMIN — Medication 10 ML/HR: at 00:33

## 2020-11-12 NOTE — ANESTHESIA PROCEDURE NOTES
Epidural Block    Start time: 11/12/2020 11:58 PM  End time: 11/12/2020 12:08 AM  Performed by: Dell Haas MD  Authorized by: Dell Haas MD     Pre-Procedure  Indication: labor epidural    Preanesthetic Checklist: patient identified, risks and benefits discussed, anesthesia consent, timeout performed and anesthesia consent    Timeout Time: 23:58        Epidural:   Patient position:  Seated  Prep region:  Lumbar  Prep: Betadine    Location:  L3-4    Needle and Epidural Catheter:   Needle Type:  Tuohy  Needle Gauge:  17 G  Injection Technique:  Loss of resistance using air  Attempts:  1  Catheter Size:  18 G  Events: no paresthesia and negative aspiration test    Test Dose:  Lidocaine 1.5% w/ epi and negative    Assessment:   Catheter Secured:  Tegaderm and tape  Insertion:  Uncomplicated  Patient tolerance:  Patient tolerated the procedure well with no immediate complications

## 2020-11-12 NOTE — PROGRESS NOTES
Patient pushing x 2.5 hours. FHR baseline 135 mod variability, no accels, intermittent variable decels with ctx    Dane q 3 min on pitocin at 10    Cvx C/C/+2, palpates as OA, mild asynclitism, minimal caput    Clinically appears to have adequate room in pelvis, and clinical EFW ~8lbs. Bladder emptied via sterile in and out cath, ~250cc. Reviewed with patient and partner options of continued pushing vs. Trial of vacuum assisted delivery. Patient wishes to proceed with continue pushing at this time.

## 2020-11-12 NOTE — L&D DELIVERY NOTE
Delivery Summary    Patient: Maria D Delacruz MRN: [id number]  SSN: [id number]    YOB: 1990  Age: 26 y.o. Sex: female      Patient progressed to complete dilation and pushed x 3.5+ hrs. Risks/benefits of vacuum assisted delivery reviewed with patient and patient agreed with trial of vacuum assisted delivery attempted due to prolonged 2nd stage and maternal exhaustion. Fetal vertex palpated and found to be in STARR position with minimal caput, +2 station. Kiwi vacuum applied in midline along saggittal suture. Suction was applied intermittently during contractions and released between contractions. Pulled x 6 ctx (with 2 popoffs) with delivery of liveborn male over intact perineum. Nuchal cord x 1 was reduced, and shoulders delivered without difficulty. Cord was clamped and cut and the infant was handed off to the neonatology team in attendance. Placenta delivered intact with gentle traction on the umbilical cord. Perineum inspected, no lacerations noted. Left distal vaginal sidewall laceration and left labial laceration repaired with 3-0 chromic. EBL 300cc. Information for the patient's :  Shannan Blu, [facility] [[id number]]       Labor Events:    Labor: No    Steroids: None   Cervical Ripening Date/Time:       Cervical Ripening Type: None   Antibiotics During Labor: Yes   Rupture Identifier:      Rupture Date/Time: 2020 1:00 AM   Rupture Type: AROM;SROM   Amniotic Fluid Volume:      Amniotic Fluid Description: Clear    Amniotic Fluid Odor: None    Induction: None       Induction Date/Time:        Indications for Induction:      Augmentation: Oxytocin   Augmentation Date/Time: 20201:54 PM   Indications for Augmentation: Ineffective Contraction Pattern; Hypertension   Labor complications:          Additional complications:        Delivery Events:  Indications For Episiotomy:     Episiotomy: None   Perineal Laceration(s): None   Repaired:     Periurethral Laceration Location: Repaired:     Labial Laceration Location: left   Repaired: Yes   Sulcal Laceration Location:     Repaired:     Vaginal Laceration Location: left   Repaired: Yes   Cervical Laceration Location:     Repaired:     Repair Suture: Chromic 3-0   Number of Repair Packets: 1   Estimated Blood Loss (ml): 300ml   Quantitative Blood Loss (ml):                Delivery Date: 2020    Delivery Time: 1:19 PM    Delivery Type: Vaginal, Vacuum (Extractor)  Vacuum attempted? No    Vacuum indication: Prolonged Labor   Vacuum type: Kiwi   Application location:     First Attempt     Time applied: 2020 12:58 PM   Time removed: 2020  1:08 PM   Second Attempt    Time applied: 2020  1:08 PM   Time removed: 2020  1:15 PM   Third Attempt    Time applied: 2020  1:16 PM   Time removed: 2020  1:17 PM   Number of pulls: 6   Number of pop-offs: 2   Low-end pressure range:     High-end pressure range: Total application time:     Applied by: Jackie Lu MD   Failed? No     Sex:  Male     Gestational Age: 44w2d  Delivery Clinician:  Kailee Lyon  Living Status: Living   Delivery Location: L&D            APGARS  One minute Five minutes Ten minutes   Skin color: 0   1        Heart rate: 2   2        Grimace: 1   2        Muscle tone: 1   2        Breathin   2        Totals: 6   9          Presentation: Vertex    Position: Left Occiput Anterior  Resuscitation Method:  Suctioning-deep; Tactile Stimulation     Meconium Stained: Terminal      Cord Information: 3 Vessels  Complications: Nuchal Cord With Compressions  Cord around: head  Delayed cord clamping?  No  Cord clamped date/time:2020  1:19 PM  Disposition of Cord Blood: Discard    Blood Gases Sent?: No    Placenta:  Date/Time: 2020  1:20 PM  Removal: Expressed      Appearance: Normal     [place] Measurements:  Birth Weight:        Birth Length:        Head Circumference:        Chest Circumference:       Abdominal Girth: Other Providers:   SAM JAIMES;OLESYA SILVEIRA;ALLEN PRASAD;ALLEN JACKSON;LUZ HANCOCK;FUNMI CHONG, Obstetrician;Primary Nurse;Charge Nurse;Nicu Nurse;Neonatologist;Respiratory Therapist     The indication, risks, and benefits of vacuum delivery compared to  delivery were discussed with the patient and attendant family member. All questions were answered. Bladder was drained prior to instrumentation. Instrumentation easily achieved and positioning was checked and verified prior to attempt at deliver. Group B Strep:   Lab Results   Component Value Date/Time    GrBStrep, External positive 10/21/2020     Information for the patient's :  Tariq Keller, Male [employer] [[id number]]   No results found for: ABORH, PCTABR, PCTDIG, BILI, ABORHEXT, ABORH     No results for input(s): PCO2CB, PO2CB, HCO3I, SO2I, IBD, PTEMPI, SPECTI, PHICB, ISITE, IDEV, IALLEN in the last 72 hours.

## 2020-11-12 NOTE — LACTATION NOTE
This note was copied from a baby's chart. This is mother's first baby. LC present for baby's first feeding. Baby was rooting and was able to latch on well to left breast with LC assistance. LC also taught mother hand expression and to express 10-20 drops if baby is too sleepy to breast feed. Discussed with mother her plan for feeding. Reviewed the benefits of exclusive breast milk feeding during the hospital stay. Informed her of the risks of using formula to supplement in the first few days of life as well as the benefits of successful breast milk feeding; referred her to the Breastfeeding booklet about this information. She acknowledges understanding of information reviewed and states that it is her plan to breastfeed  her infant. Will support her choice and offer additional information as needed. Encouraged mom to attempt feeding with baby led feeding cues. Just as sucking on fingers, rooting, mouthing. Looking for 8-12 feedings in 24 hours. Don't limit baby at breast, allow baby to come of breast on it's own. Baby may want to feed  often and may increase number of feedings on second day of life. Skin to skin encouraged. If baby doesn't nurse,  Mom should  hand express  10-20 drops of colostrum and drip into baby's mouth, or give to baby by finger feeding, cup feeding, or spoon feeding at least every 2-3 hours. Mother will successfully establish breastfeeding by feeding in response to early feeding cues   or wake every 3h, will obtain deep latch, and will keep log of feedings/output. Taught to BF at hunger cues and or q 2-3 hrs and to offer 10-20 drops of hand expressed colostrum at any non-feeds.       Breast Assessment  Left Breast: Large  Left Nipple: Everted, Intact, Short, Large  Right Breast: Large  Right Nipple: Everted, Intact, Large, Short  Breast- Feeding Assessment  Attends Breast-Feeding Classes: No  Breast-Feeding Experience: No  Breast Trauma/Surgery: No  Type/Quality: Good  Lactation Consultant Visits  Breast-Feedings: Good ([address] present for baby's first feeding - baby latched on well to left breast with  assistance.)  Mother/Infant Observation  Mother Observation: Alignment, Holds breast, Close hold  Infant Observation: Audible swallows, Lips flanged, upper, Opens mouth, Feeding cues, Rhythmic suck, Latches nipple and aereolae, Lips flanged, lower  LATCH Documentation  Latch: Grasps breast, tongue down, lips flanged, rhythmic sucking  Audible Swallowing: A few with stimulation  Type of Nipple: Everted (after stimulation)  Comfort (Breast/Nipple): Soft/non-tender  Hold (Positioning): Full assist, teach one side, mother does other, staff holds  [facility] Score: 8

## 2020-11-12 NOTE — PROGRESS NOTES
I have received SBAR from Dr. Craolyne Busby, have assumed care of the patient, and have introduced myself to the patient and her spouse. The patient is having regular painful uterine contractions and she had SROM at 1920 with clear fluid noted.     Visit Vitals  /67 (BP 1 Location: Left arm, BP Patient Position: Sitting)   Pulse 83   Temp 98.7 °F (37.1 °C)   Resp 16   Ht 5' 6\" (1.676 m)   Wt 89.8 kg (198 lb)   SpO2 98%   BMI 31.96 kg/m²     FHR: 135 moderate variability, accelerations, occasional variable decelerations  [place]: contractions q 1-3 minute, pitocin at 12 mu  EFW: 7#10  SVE: 3/80/-2 vtx with amniotomy of fore bag    Ass:  at 39 1/7 wks with augmentation of labor, gestational htn, GBS positive    Plan: Stadol 2 mg iv q 3 hrs during early labor prn pain  Has received 2 doses of PCN thus far  Epidural prn  Labor management

## 2020-11-12 NOTE — PROGRESS NOTES
11/12/2020  9:31 AM    CM met with MOB to complete initial assessment and begin discharge planning. MOB verified and confirmed demographics. OPAL lives with SHABBIR- Samanta Gilbert, at the address on file. This is the couples' first baby. OPAL is employed with [employer], and plans to take 8-10 wks off from work. FOB is also employed and will be taking adequate time off. OPAL reports she has good family support. OPAL plans to breast feed baby and has pump to use at home. MOB plans to follow with [facility]. OPAL has car seat, bassinet/crib, clothing, bottles and all necessary supplies for baby. OPAL has [employer], and will be adding baby to this policy. CM discussed process to add baby to insurance, MOB verbalized understanding. MOB denied needing WIC/Medicaid services. Care Management Interventions  PCP Verified by CM: Yes([facility])  Mode of Transport at Discharge:  Other (see comment)  Transition of Care Consult (CM Consult): Discharge Planning  Current Support Network: Own Home  Confirm Follow Up Transport: Family  Discharge Location  Discharge Placement: Home with family assistance  Amelie Hurd

## 2020-11-12 NOTE — PROGRESS NOTES
1902 Bedside shift change report given to Ladonna Loera RN (oncoming nurse) by Tiney Spatz RN (offgoing nurse). Report included the following information SBAR, Intake/Output, MAR and Recent Results. 1903  Assumed care of the patient after receiving bedside report. Pt sitting in the side of the bed, breathing through contractions, rating pain 10/10, pt declines pain medication at this time. Pt requesting to ambulate to bathroom at this time. 1918 Pt assisted back to bed by RN, efmx2 adjusted. 1946 RN entered room, pt resting in R Lateral, breathing through contractions, offered possible IV pain medication, pt agreeable, informed will discuss with MD. Wilfrido adjusted. 2002 RN entered room, pt in bed, requesting assistance with repostitioning for pain relief. 2018 RN remains at bedside, pt sitting on the side of the bed breathing through contractions    2105 Dr Angélica Oliveira at bedside to evaluate patient    2107 sve by provider 3/80 /-2    2110 AROM of Forebag    2112 provider remains at bedside discussing options for pain management, pt would like to try IV pain medication at this time, and open to epidural if necessary. Provider stated she will enter orders, provider left bedside. 2131 Stadol 2 mg IVP for pain     2150 Phenergan 12.5 mg IVPB    2240 RN entered room, pt resting in bed with eyes closed, pt breathing through contractions and moaning a little with contractions, reports adequate pain relief from IV medications. 2315 RN at bedside pt resting in bed, becoming increasingly uncomfortable with contractions. Pt requesting epidural at this time. Fluid bolus started.      2318 pt up to bathroom, accompanied by FOB    2328 pt returned back to bed from [facility] Dr Fernandez Erps notified of patient request for epidural    2355 Dr Rebecca Vanegas at bedside to evaluate patient for epidural placement    0000 timeout for epidural placement    0002 epidural catheter placed    0003 test dose administered by anesthesia provider    0005 Pt placed in R Lateral following epidural placement, anesthesia provider left bedside    0007 EFMx2 reapplied to soft gravid abdomen    0025 Pt repositioned to L Lateral, peanut ball placed, efmx2 adjusted    0033 Epidural bag hung, rate and fluid per orders, verified with Noah Goff at bedside    0117 sve by provider, 4/90/-2, provider stated to given pitocin break x2 hours, pitocin discontinued at this time. 0121 Abad catheter placed by this RN, tolerated well by patient    0200 RN entered room, pt resting in bed eyes closed, respirations even and unlabored. No voiced needs at this time. 3509 RN at bedside, pt resting comfortably in bed, easily aroused by RN. Denies pain or discomfort with uterine contractions. Pen 2.5 mil units administered IVPB per order. 0320 Pt repositioned to R Lateral with peanut ball, efmx2 adjusted. 0400 RN entered room, pt resting comfortably in bed, oxytocin restarted at this time per provider order. 5225 RN at bedside, pt resting in bed, denies pain or discomfort. Pitocin increased to 6 mu/min.      0625 Pt repositioned to L Lateral    0705 Bedside shift change report given to BERTHA Pappas RN (oncoming nurse) by Shannen Canela RN (offgoing nurse). Report included the following information SBAR, Intake/Output, MAR and Recent Results.

## 2020-11-12 NOTE — ANESTHESIA PREPROCEDURE EVALUATION
Relevant Problems   No relevant active problems       Anesthetic History   No history of anesthetic complications            Review of Systems / Medical History  Patient summary reviewed and pertinent labs reviewed    Pulmonary  Within defined limits                 Neuro/Psych   Within defined limits           Cardiovascular    Hypertension              Exercise tolerance: >4 METS     GI/Hepatic/Renal  Within defined limits              Endo/Other  Within defined limits           Other Findings              Physical Exam    Airway  Mallampati: II  TM Distance: 4 - 6 cm  Neck ROM: normal range of motion   Mouth opening: Normal     Cardiovascular  Regular rate and rhythm,  S1 and S2 normal,  no murmur, click, rub, or gallop  Rhythm: regular  Rate: normal         Dental  No notable dental hx       Pulmonary  Breath sounds clear to auscultation               Abdominal  GI exam deferred       Other Findings            Anesthetic Plan    ASA: 2  Anesthesia type: epidural            Anesthetic plan and risks discussed with: Patient      Charting completed after epidural placement.

## 2020-11-12 NOTE — PROGRESS NOTES
Patient comfortable with epidural.    /61   Pulse 91   Temp 98.8 °F (37.1 °C)   Resp 15   Ht 5' 6\" (1.676 m)   Wt 89.8 kg (198 lb)   SpO2 98%   BMI 31.96 kg/m²     FHR baseline 140 moderate variability +accels intermittent mild decels    Cvx 90%/9cm/0    A/P G1 @ 39w2d, undergoing augmentation due to [facility]. BP normal this AM. Good progress since last check, anticipate .

## 2020-11-12 NOTE — PROGRESS NOTES
0700: Bedside and Verbal shift change report given to BERTHA Pappas RN (oncoming nurse) by Chitra Vaughan RN (offgoing nurse). Report included the following information SBAR, Kardex, Procedure Summary, Intake/Output, MAR, Accordion, Recent Results and Med Rec Status. 9455: RN notes that pt in in left hip tilt with late deceleration. RN places pt in left lateral position at this time. 3038Alexandre Murray MD at [facility]. MD performs SVE and discusses POC with pt. Pt is 9/90/0. Delivery table is set up at this time. 0918:Pt states that she is feeling fundal pressure. RN performs SVE. Pt is 10/100/0. Camron VALDOVINOS notified at this time. 1130: Md at bedside. Orders for 1000mg of tylenol to be given for fever above 100.5. Pt temperature checked, reads 100.6. Tylenol to be given. 1207: MD at bedside. Performs SVE. MD determines that fetal position is OA and is asynclitic. 1250: Camron VALDOVINOS at [facility] to evaluate progress with pushing. PT consents to VAVD. 1258: Vacuum applied at this time by MD. Suction released between CTX and applied within parameters. 1340: Pt delivers viable male infant via VAVD. QBL at delivery is 650. Pt has left labial and left vaginal tear both repaired. 1719: TRANSFER - OUT REPORT:    Verbal report given to Taisha WILLSON(name) on Emerita Keller  being transferred to MIU (unit) for routine progression of care       Report consisted of patients Situation, Background, Assessment and   Recommendations(SBAR). Information from the following report(s) SBAR, Kardex, Procedure Summary, Intake/Output, MAR, Accordion, Recent Results and Med Rec Status was reviewed with the receiving nurse.     Lines:   Peripheral IV 11/11/20 Right;Posterior Forearm (Active)   Site Assessment Clean, dry, & intact 11/11/20 1921   Phlebitis Assessment 0 11/11/20 1921   Infiltration Assessment 0 11/11/20 1921   Dressing Status Clean, dry, & intact 11/11/20 1921   Dressing Type Tape;Transparent 11/11/20 1921 Hub Color/Line Status Pink 11/11/20 1921   Alcohol Cap Used Yes 11/11/20 1921        Opportunity for questions and clarification was provided.       Patient transported with:   Registered Nurse

## 2020-11-12 NOTE — PROGRESS NOTES
The patient has recently received her epidural and is comfortable.     Visit Vitals  BP (!) (P) 143/92 (BP 1 Location: Left arm, BP Patient Position: Sitting)   Pulse 75   Temp 98.7 °F (37.1 °C)   Resp (P) 16   Ht 5' 6\" (1.676 m)   Wt 89.8 kg (198 lb)   SpO2 (P) 99%   BMI 31.96 kg/m²     FHR: 135 moderate variability, early decelerations, occasional late decelerations, cat 2  [place]: contractions q 1-4 minutes, pitocin at 12 mu  SVE: 4/90/-2 vtx    Ass:  at 39 2/7 wks in the early stage of labor with gestational htn    Plan: Pitocin break for 2 hour and then restart

## 2020-11-13 LAB
HCT VFR BLD AUTO: 29.3 % (ref 35–47)
HGB BLD-MCNC: 9.4 G/DL (ref 11.5–16)

## 2020-11-13 PROCEDURE — 77030021125

## 2020-11-13 PROCEDURE — 74011250637 HC RX REV CODE- 250/637: Performed by: OBSTETRICS & GYNECOLOGY

## 2020-11-13 PROCEDURE — 85018 HEMOGLOBIN: CPT

## 2020-11-13 PROCEDURE — 36415 COLL VENOUS BLD VENIPUNCTURE: CPT

## 2020-11-13 PROCEDURE — 65270000029 HC RM PRIVATE

## 2020-11-13 PROCEDURE — 2709999900 HC NON-CHARGEABLE SUPPLY

## 2020-11-13 RX ADMIN — OXYCODONE HYDROCHLORIDE AND ACETAMINOPHEN 1 TABLET: 5; 325 TABLET ORAL at 16:48

## 2020-11-13 RX ADMIN — IBUPROFEN 800 MG: 800 TABLET ORAL at 17:58

## 2020-11-13 RX ADMIN — IBUPROFEN 800 MG: 800 TABLET ORAL at 09:59

## 2020-11-13 RX ADMIN — OXYCODONE HYDROCHLORIDE AND ACETAMINOPHEN 1 TABLET: 5; 325 TABLET ORAL at 22:57

## 2020-11-13 RX ADMIN — OXYCODONE HYDROCHLORIDE AND ACETAMINOPHEN 1 TABLET: 5; 325 TABLET ORAL at 03:56

## 2020-11-13 RX ADMIN — Medication 1 TABLET: at 09:59

## 2020-11-13 RX ADMIN — OXYCODONE HYDROCHLORIDE AND ACETAMINOPHEN 1 TABLET: 5; 325 TABLET ORAL at 09:59

## 2020-11-13 RX ADMIN — IBUPROFEN 800 MG: 800 TABLET ORAL at 01:25

## 2020-11-13 NOTE — LACTATION NOTE
This note was copied from a baby's chart. Mother states baby has been breastfeeding well today. Baby breast well during [facility] visit - baby nursed vigorously. LC reviewed the following:    Reviewed breastfeeding basics:  Supply and demand, breastfeed baby 8-12 times in 24 hr., feed on demand,  stomach size, early  Feeding cues, skin to skin, positioning and baby led latch-on, assymetrical latch with signs of good, deep latch vs shallow, feeding frequency and duration, and log sheet for tracking infant feedings and output. Breastfeeding Booklet and Warm line information given. Discussed typical  weight loss and the importance of infant weight checks with pediatrician 1-2 post discharge. Mother has a pump for home use - discussed waiting 3-4 weeks to pump and how to store and prepare expressed breast milk for baby. Discussed what to do if nipples become sore - Care for sore/tender nipples discussed:  ways to improve positioning and latch practiced and discussed, hand express colostrum after feedings and let air dry, light application of lanolin, hydrogel pads, seek comfortable laid back feeding position, start feedings on least sore side first.    Mother will successfully establish breastfeeding by feeding in response to early feeding cues   or wake every 3h, will obtain deep latch, and will keep log of feedings/output. Taught to BF at hunger cues and or q 2-3 hrs and to offer 10-20 drops of hand expressed colostrum at any non-feeds. Breast Assessment  Left Breast: Large  Left Nipple: Everted, Intact, Large, Short  Right Breast: Large  Right Nipple: Everted, Intact, Large, Short  Breast- Feeding Assessment  Attends Breast-Feeding Classes: No  Breast-Feeding Experience: No  Breast Trauma/Surgery: No  Type/Quality: Good(Per mother)  Lactation Consultant Visits  Breast-Feedings: Good (Mother states baby breast fed on left breast for 10 min.  She switched sides and was nursing baby on right breast. Baby latched on well and nursed vigorously in cradle hold.)  Mother/Infant Observation  Mother Observation: Alignment, Holds breast, Breast comfortable, Close hold, Cramps, Recognizes feeding cues  Infant Observation: Audible swallows, Lips flanged, upper, Opens mouth, Feeding cues, Frenulum checked, Rhythmic suck, Lips flanged, lower  LATCH Documentation  Latch: Grasps breast, tongue down, lips flanged, rhythmic sucking  Audible Swallowing: Spontaneous and intermittent (24 hours old)  Type of Nipple: Everted (after stimulation)  Comfort (Breast/Nipple): Soft/non-tender  Hold (Positioning): Full assist, teach one side, mother does other, staff holds  [facility] Score: 9

## 2020-11-13 NOTE — PROGRESS NOTES
Post-Partum Day Number 1 Progress Note    Yordy Higuera   26 y.o. Information for the patient's :  Angelo Iyer Male Durwood Barnacle [[id number]]   Vaginal, Vacuum (Extractor)    Patient doing well without significant complaint. Voiding without difficulty, normal lochia. Vaginal soreness. Ambulates slowly. Circ desired. Breast feeding. Vitals:  Visit Vitals  /73   Pulse 93   Temp 98 °F (36.7 °C)   Resp 16   Ht 5' 6\" (1.676 m)   Wt 89.8 kg (198 lb)   SpO2 98%   Breastfeeding Unknown   BMI 31.96 kg/m²     Temp (24hrs), Av.8 °F (37.1 °C), Min:97.9 °F (36.6 °C), Max:99.7 °F (37.6 °C)      Exam:   Patient without distress. FF @ U-2 NT                LE NT w 1+ edema    Labs:     Lab Results   Component Value Date/Time    WBC 14.1 (H) 2020 01:26 PM    HGB 9.4 (L) 2020 03:00 AM    HGB 11.6 2020 01:26 PM    HCT 29.3 (L) 2020 03:00 AM    HCT 36.3 2020 01:26 PM    PLATELET 147  01:26 PM       Recent Results (from the past 24 hour(s))   HGB & HCT    Collection Time: 20  3:00 AM   Result Value Ref Range    HGB 9.4 (L) 11.5 - 16.0 g/dL    HCT 29.3 (L) 35.0 - 47.0 %         Assessment: PPD 1 s/p , stable; A+/RI/XY \"Florin\"    Plan:  1. Continue routine postpartum care  2. Circ desired  3.  Lactation consult      Teena Rosen MD  2020

## 2020-11-13 NOTE — PROGRESS NOTES
Bedside and Verbal shift change report given to Jason Richardson RN (oncoming nurse) by Ramos Lopez RN (offgoing nurse). Report included the following information SBAR, Kardex, Intake/Output and MAR.

## 2020-11-14 VITALS
SYSTOLIC BLOOD PRESSURE: 145 MMHG | HEIGHT: 66 IN | WEIGHT: 198 LBS | DIASTOLIC BLOOD PRESSURE: 96 MMHG | TEMPERATURE: 98.5 F | BODY MASS INDEX: 31.82 KG/M2 | HEART RATE: 96 BPM | RESPIRATION RATE: 18 BRPM | OXYGEN SATURATION: 98 %

## 2020-11-14 PROCEDURE — 2709999900 HC NON-CHARGEABLE SUPPLY

## 2020-11-14 PROCEDURE — 74011250637 HC RX REV CODE- 250/637: Performed by: OBSTETRICS & GYNECOLOGY

## 2020-11-14 PROCEDURE — 77030036554

## 2020-11-14 RX ORDER — IBUPROFEN 800 MG/1
800 TABLET ORAL
Qty: 30 TAB | Refills: 1 | Status: SHIPPED | OUTPATIENT
Start: 2020-11-14

## 2020-11-14 RX ORDER — DOCUSATE SODIUM 100 MG/1
100 CAPSULE, LIQUID FILLED ORAL 2 TIMES DAILY
Qty: 30 CAP | Refills: 0 | Status: SHIPPED | OUTPATIENT
Start: 2020-11-14 | End: 2020-11-29

## 2020-11-14 RX ORDER — OXYCODONE AND ACETAMINOPHEN 5; 325 MG/1; MG/1
1 TABLET ORAL
Qty: 8 TAB | Refills: 0 | Status: SHIPPED | OUTPATIENT
Start: 2020-11-14 | End: 2020-11-17

## 2020-11-14 RX ADMIN — OXYCODONE HYDROCHLORIDE AND ACETAMINOPHEN 1 TABLET: 5; 325 TABLET ORAL at 13:12

## 2020-11-14 RX ADMIN — IBUPROFEN 800 MG: 800 TABLET ORAL at 02:15

## 2020-11-14 RX ADMIN — Medication 1 TABLET: at 10:46

## 2020-11-14 RX ADMIN — OXYCODONE HYDROCHLORIDE AND ACETAMINOPHEN 1 TABLET: 5; 325 TABLET ORAL at 06:15

## 2020-11-14 RX ADMIN — IBUPROFEN 800 MG: 800 TABLET ORAL at 10:47

## 2020-11-14 RX ADMIN — MUPIROCIN: 20 OINTMENT TOPICAL at 06:15

## 2020-11-14 NOTE — DISCHARGE SUMMARY
Obstetrical Discharge Summary     Name: Sophia Gant MRN: [id number]  SSN: [id number]    YOB: 1990  Age: 26 y.o. Sex: female      Admit Date: 2020    Discharge Date: 2020     Admitting Physician: Maggi Yan MD     Attending Physician:  Lauren Sandoval MD     Admission Diagnoses: Gestational hypertension affecting first pregnancy [O13.9]    Discharge Diagnoses:   Information for the patient's :  Juanna Breech Male Barnet Book [[id number]]   Delivery of a 3.455 kg male infant via Vaginal, Vacuum (Extractor) on 2020 at 1:19 PM  by Georgette Earnest. Apgars were 6  and 9 . Additional Diagnoses:   Hospital Problems  Never Reviewed          Codes Class Noted POA    Gestational hypertension affecting first pregnancy ICD-10-CM: O13.9  ICD-9-CM: 642.30  2020 Unknown        False labor after 37 weeks of gestation without delivery ICD-10-CM: O47.1  ICD-9-CM: 644.10  2020 Unknown             Lab Results   Component Value Date/Time    Rubella, External immune 2020    GrBStrep, External positive 10/21/2020       Hospital Course: Normal hospital course following the delivery. Disposition at Discharge: Home or self care    Discharged Condition: Stable    Patient Instructions:   Current Discharge Medication List      START taking these medications    Details   ibuprofen (MOTRIN) 800 mg tablet Take 1 Tab by mouth every eight (8) hours as needed for Pain. Qty: 30 Tab, Refills: 1      oxyCODONE-acetaminophen (PERCOCET) 5-325 mg per tablet Take 1 Tab by mouth every six (6) hours as needed for Pain for up to 3 days. Max Daily Amount: 4 Tabs. Qty: 8 Tab, Refills: 0    Associated Diagnoses: Postoperative pain      docusate sodium (Colace) 100 mg capsule Take 1 Cap by mouth two (2) times a day for 30 doses.   Qty: 30 Cap, Refills: 0         CONTINUE these medications which have NOT CHANGED    Details   prenatal vit-calcium-iron-fa (Prenatal Plus, calcium carb,) 27 mg iron- 1 mg tab Take 1 Tab by mouth daily. Reference my discharge instructions. Follow-up Appointments   Procedures    FOLLOW UP VISIT Appointment in: 6 Weeks Post partum follow up with OB provider in 6 weeks. Post partum follow up with OB provider in 6 weeks. Standing Status:   Standing     Number of Occurrences:   1     Standing Expiration Date:   11/15/2020     Order Specific Question:   Appointment in     Answer:   6 Weeks        Signed By:  Oscar Fontenot MD     November 14, 2020                      .

## 2020-11-14 NOTE — LACTATION NOTE
This note was copied from a baby's chart. Mom  Needing hep getting baby to latch. Drops expressed and shown how to hold nipple in a c hold to help baby get on deeper . Pt will successfully establish breastfeeding by feeding in response to early feeding cues   or wake every 3h, will obtain deep latch, and will keep log of feedings/output. Taught to BF at hunger cues and or q 2-3 hrs and to offer 10-20 drops of hand expressed colostrum at any non-feeds.       Breast Assessment  Left Breast: Large  Left Nipple: Everted, Intact, Painful, Compression stripe(has mehdi newmans and nipple therapy pads)  Right Breast: Large  Right Nipple: Everted, Tender, Intact  Breast- Feeding Assessment  Attends Breast-Feeding Classes: No  Breast-Feeding Experience: No  Breast Trauma/Surgery: No  Type/Quality: Good  Lactation Consultant Visits  Breast-Feedings: Good   Mother/Infant Observation  Mother Observation: Alignment, Breast comfortable, Close hold, Cramps, Holds breast  Infant Observation: Breast tissue moves, Latches nipple and aereolae, Lips flanged, lower, Audible swallows, Opens mouth, Lips flanged, upper  LATCH Documentation  Latch: Repeated attempts, hold nipple in mouth, stimulate to suck  Audible Swallowing: Spontaneous and intermittent (24 hours old)  Type of Nipple: Everted (after stimulation)  Comfort (Breast/Nipple): Filling, red/small blisters/bruises, mild/mod discomfort(crack around base of left breast nipple)  Hold (Positioning): Full assist, teach one side, mother does other, staff holds  [facility] Score: 7

## 2020-11-14 NOTE — LACTATION NOTE
This note was copied from a baby's chart. Baby in nursery getting a Circ. Discussed possibility that baby May be sleepy and refuse to breastfeed after circumcision. Try skin to skin intervals and encourage latching every hour until nurses. May also hand express drops into baby's mouth. May pump if does not nurse in 4 hours of procedure. Mom comfortable with hand expression  Many drops noted    Discussed ways to help baby nurse, skin to skin, and expressing drops. Mom to call with next feeding. Discussed breast feeding discharge information. Breast Feeding Discharge Information discussed:    Chart shows numerous feedings, void, stool WNL. Discussed Importance of monitoring outputs and feedings on first week of  Breastfeeding. Discussed ways to tell if baby getting enough, ie  Voids and stools, by day 7, baby should have at least  4-6 wet diapers a day, change in color of stool to a seedy yellow, and return to birth wt within 2 weeks with a steady increase after that. .  Follow up with pediatrician visit for weight check in 1-2 days reviewed. Discussed Breast feeding support groups and encouraged to call Warm line number, [phone]  for any breast feeding questions or problems that arise. Please leave a message and tell us what is going on. We will return your call within 24 hours. Please repeat your phone number. Feedings  Encouraged mom to attempt feeding with baby led feeding cues. Just as sucking on fingers, rooting, mouthing. Looking for 8-12 feedings in 24 hours. Don't limit baby at breast, allow baby to come off breast on it's own. Baby may want to feed  often and may increase number of feedings on second day of life. Skin to skin encouraged. In 4-6 weeks, baby may go though a growth spurt and increase feedings for several days to increase your milk supply. If baby doesn't nurse,  Mom should Pump or hand express drops, 12-18 drops, and give infant any expressed milk.   If not pumping any milk, mom should contact pediatrician for possible need for supplementation. MOM's DIET    Discussed eating a healthy diet. Instructed mother to eat a variety of foods in order to get a well balanced diet. She should consume an extra 300-500 calories per day (more than her non-pregnant requirement.) These extra calories will help provide energy needed for optimal breast milk production. Mother also encouraged to \"drink to thirst\" and it is recommended that she drink fluids such as water and fruit/vegetable juice. Nutritious snacks should be available so that she can eat throughout the day to help satisfy her hunger and maintain a good milk supply. Continue taking your Prenatal vitamins as long as you breast feed. Engorgement Care Guidelines:  Anticipatory guidance shared. If breast become engorged, to help decrease engorgement. Frequent breastfeeding encouraged, cool packs around breast after nursing may help. May take motrin or Ibuprofen as ordered by your Doctor.       Call your doctor, midwife and/or lactation consultant if:   Lashell Bodily is having no wet or dirty diapers    Baby has dark colored urine after day 3  (should be pale yellow to clear)    Baby has dark colored stools after day 4  (should be mustard yellow, with no meconium)    Baby has fewer wet/soiled diapers or nurses less   frequently than the goals listed here    Mom has symptoms of mastitis   (sore breast with fever, chills, flu-like aching)

## 2020-11-14 NOTE — DISCHARGE INSTRUCTIONS
POST DELIVERY DISCHARGE INSTRUCTIONS    Name: Shiva Almaraz  YOB: 1990  Primary Diagnosis: Active Problems:    False labor after 37 weeks of gestation without delivery (2020)      Gestational hypertension affecting first pregnancy (2020)        General:     Diet/Diet Restrictions:  Eight 8-ounce glasses of fluid daily (water, juices); avoid excessive caffeine intake. Meals/snacks as desired which are high in fiber and carbohydrates and low in fat and cholesterol. Physical Activity / Restrictions / Safety:     Avoid heavy lifting, no more that 8 lbs. For 2-3 weeks; limit use of stairs to 2 times daily for the first week home. No driving for one week. Avoid intercourse 4-6 weeks, no douching or tampon use. Check with obstetrician before starting or resuming an exercise program.         Discharge Instructions/Special Treatment/Home Care Needs:     Continue prenatal vitamins. Continue to use squirt bottle with warm water on your episiotomy after each bathroom use until bleeding stops. If steri-strips applied to your incision, remove in 7-10 days. Call your doctor for the following:     Fever over 101 degrees by mouth. Vaginal bleeding heavier than a normal menstrual period or clot larger than a golf ball. Red streaks or increased swelling of legs, painful red streaks on your breast.  Painful urination, constipation and increased pain or swelling or discharge with your incision. If you feel extremely anxious or overwhelmed. If you have thoughts of harming yourself and/or your baby. Pain Management:     Pain Management:   Take Acetaminophen (Tylenol) or Ibuprofen (Advil, Motrin), as directed for pain. Use a warm Sitz bath 3 times daily to relieve episiotomy or hemorrhoidal discomfort. Heating pad to  incision as needed. For hemorrhoidal discomfort, use Tucks and Anusol cream as needed and directed. Follow-Up Care:      These are general instructions for a healthy lifestyle:    No smoking/ No tobacco products/ Avoid exposure to second hand smoke    Surgeon General's Warning:  Quitting smoking now greatly reduces serious risk to your health. Obesity, smoking, and sedentary lifestyle greatly increases your risk for illness    A healthy diet, regular physical exercise & weight monitoring are important for maintaining a healthy lifestyle    Recognize signs and symptoms of STROKE:    F-face looks uneven    A-arms unable to move or move unevenly    S-speech slurred or non-existent    T-time-call 911 as soon as signs and symptoms begin-DO NOT go       Back to bed or wait to see if you get better-TIME IS BRAIN.

## 2020-11-14 NOTE — PROGRESS NOTES
Bedside and Verbal shift change report given to Kayce Pierce RN (oncoming nurse) by Mari Mccurdy RN (offgoing nurse). Report included the following information SBAR, Kardex, Intake/Output and MAR.

## 2020-11-14 NOTE — PROGRESS NOTES
Post-Partum Day Number 2 Progress Note    Telma Jesus     Assessment: Doing well, post partum day 2    Plan:   1. Discharge home today  2. The risks and benefits of the circumcision  procedure and anesthesia including: bleeding, infection, variability of cosmetic results were discussed at length with the parents. . They are aware that future repeat procedures may be necessary. They give informed consent to proceed as noted and their questions are answered. 3. Follow up in office in 6 weeks with Nargis Ibrahim MD  4. Post partum activity advised, diet as tolerated  5. Discharge Medications: ibuprofen, percocet and medications prior to admission    Information for the patient's :  Rose Hurley Male Francisco Littler [[id number]]   Vaginal, Vacuum (Extractor)    Patient doing well without significant complaint. Voiding without difficulty, normal lochia. Vitals:  Visit Vitals  /82 (BP 1 Location: Left arm, BP Patient Position: Sitting)   Pulse 93   Temp 98.3 °F (36.8 °C)   Resp 18   Ht 5' 6\" (1.676 m)   Wt 89.8 kg (198 lb)   SpO2 98%   Breastfeeding Unknown   BMI 31.96 kg/m²     Temp (24hrs), Av.3 °F (36.8 °C), Min:98 °F (36.7 °C), Max:98.6 °F (37 °C)      Exam:         Patient without distress. Abdomen soft, fundus firm, nontender                 Lower extremities are negative for swelling, cords or tenderness. Labs:     Lab Results   Component Value Date/Time    WBC 14.1 (H) 2020 01:26 PM    HGB 9.4 (L) 2020 03:00 AM    HGB 11.6 2020 01:26 PM    HCT 29.3 (L) 2020 03:00 AM    HCT 36.3 2020 01:26 PM    PLATELET 686  01:26 PM       No results found for this or any previous visit (from the past 24 hour(s)).